# Patient Record
Sex: MALE | NOT HISPANIC OR LATINO | URBAN - METROPOLITAN AREA
[De-identification: names, ages, dates, MRNs, and addresses within clinical notes are randomized per-mention and may not be internally consistent; named-entity substitution may affect disease eponyms.]

---

## 2024-09-21 ENCOUNTER — OFFICE VISIT (OUTPATIENT)
Age: 69
End: 2024-09-21
Payer: MEDICARE

## 2024-09-21 VITALS
DIASTOLIC BLOOD PRESSURE: 72 MMHG | HEIGHT: 68 IN | TEMPERATURE: 97.9 F | BODY MASS INDEX: 27.22 KG/M2 | HEART RATE: 64 BPM | WEIGHT: 179.6 LBS | RESPIRATION RATE: 18 BRPM | OXYGEN SATURATION: 99 % | SYSTOLIC BLOOD PRESSURE: 121 MMHG

## 2024-09-21 DIAGNOSIS — H00.015 HORDEOLUM EXTERNUM OF LEFT LOWER EYELID: Primary | ICD-10-CM

## 2024-09-21 PROBLEM — I10 HYPERTENSION: Status: ACTIVE | Noted: 2024-09-21

## 2024-09-21 PROBLEM — G47.33 OBSTRUCTIVE SLEEP APNEA SYNDROME: Status: ACTIVE | Noted: 2023-01-31

## 2024-09-21 PROBLEM — I48.91 ATRIAL FIBRILLATION (HCC): Status: ACTIVE | Noted: 2021-09-16

## 2024-09-21 PROBLEM — G70.00 MYASTHENIA GRAVIS (HCC): Status: ACTIVE | Noted: 2021-09-16

## 2024-09-21 PROBLEM — G47.31 CENTRAL SLEEP APNEA SYNDROME: Status: ACTIVE | Noted: 2023-01-31

## 2024-09-21 PROBLEM — M25.561 PAIN IN RIGHT KNEE: Status: ACTIVE | Noted: 2023-08-09

## 2024-09-21 PROBLEM — E78.5 HYPERLIPEMIA: Status: ACTIVE | Noted: 2024-09-21

## 2024-09-21 PROBLEM — G47.30 ORGANIC SLEEP APNEA: Status: ACTIVE | Noted: 2021-09-16

## 2024-09-21 PROBLEM — M25.521 PAIN IN RIGHT ELBOW: Status: ACTIVE | Noted: 2023-08-09

## 2024-09-21 PROCEDURE — 99203 OFFICE O/P NEW LOW 30 MIN: CPT

## 2024-09-21 PROCEDURE — G0463 HOSPITAL OUTPT CLINIC VISIT: HCPCS

## 2024-09-21 PROCEDURE — 99213 OFFICE O/P EST LOW 20 MIN: CPT

## 2024-09-21 RX ORDER — METOPROLOL SUCCINATE 50 MG/1
TABLET, EXTENDED RELEASE ORAL
COMMUNITY

## 2024-09-21 RX ORDER — APIXABAN 5 MG/1
TABLET, FILM COATED ORAL
COMMUNITY

## 2024-09-21 RX ORDER — SIMVASTATIN 10 MG
TABLET ORAL
COMMUNITY

## 2024-09-21 RX ORDER — MONTELUKAST SODIUM 10 MG/1
TABLET ORAL
COMMUNITY
Start: 2024-07-09

## 2024-09-21 RX ORDER — PYRIDOSTIGMINE BROMIDE 60 MG/1
TABLET ORAL
COMMUNITY

## 2024-09-21 RX ORDER — ERYTHROMYCIN 5 MG/G
0.5 OINTMENT OPHTHALMIC
Qty: 3.5 G | Refills: 0 | Status: SHIPPED | OUTPATIENT
Start: 2024-09-21

## 2024-09-21 NOTE — PROGRESS NOTES
[facility]        NAME: Ashutosh Kendall is a 68 y.o. male  : 1955    MRN: [id number]  DATE: 2024  TIME: 10:05 AM    Assessment and Plan   Hordeolum externum of left lower eyelid [H00.015]  1. Hordeolum externum of left lower eyelid  erythromycin (ILOTYCIN) ophthalmic ointment            Patient Instructions   Use antibiotic ointment as prescribed - apply to subconjunctival sac and also the external part of the left lower eyelid.  Warm compresses may be created by soaking a cloth towel in hot water and then applied with gentle pressure onto the closed eyelid. They may be applied 4 to 5 times per day for 10 to 15 minutes and may be accompanied by gentle massage of the area       Follow up with Primary Care Provider in 3-5 days if not improving.  Proceed to Emergency Department if symptoms worsen.    If tests have been performed at [facility], our office will contact you with results if changes need to be made to the care plan discussed with you at the visit.  [facility]    Chief Complaint     Chief Complaint   Patient presents with    Eye Problem     Started Wednesday. Patient reports swelling/pain L eye. Noted small stye on L eye.          History of Present Illness       Swelling and watery discharge from the left eye starting about 3 days ago.  Reports no specific injuries.  No dizziness or lightheadedness, no cough or shortness of breath, no nausea vomiting or diarrhea.    Eye Problem   Associated symptoms include an eye discharge. Pertinent negatives include no fever, vomiting or weakness.       Review of Systems   Review of Systems   Constitutional:  Negative for chills and fever.   HENT:  Negative for ear pain and sore throat.    Eyes:  Positive for pain and discharge. Negative for visual disturbance.        Left lower eyelid swelling   Respiratory:  Negative for cough and shortness of breath.    Cardiovascular:  Negative for chest pain and  "palpitations.   Gastrointestinal:  Negative for abdominal pain and vomiting.   Genitourinary:  Negative for dysuria and hematuria.   Musculoskeletal:  Negative for arthralgias and back pain.   Skin:  Negative for color change and rash.   Neurological:  Negative for dizziness, seizures, syncope, weakness and light-headedness.   All other systems reviewed and are negative.        Current Medications       Current Outpatient Medications:     Eliquis 5 MG, , Disp: , Rfl:     erythromycin (ILOTYCIN) ophthalmic ointment, Administer 0.5 inches into the left eye every 6 (six) hours while awake For 7 days., Disp: 3.5 g, Rfl: 0    metoprolol succinate (TOPROL-XL) 50 mg 24 hr tablet, , Disp: , Rfl:     montelukast (SINGULAIR) 10 mg tablet, , Disp: , Rfl:     Multiple Vitamin (MULTIVITAMIN ADULT PO), Take by mouth, Disp: , Rfl:     pyridostigmine (MESTINON) 60 mg tablet, , Disp: , Rfl:     simvastatin (ZOCOR) 10 mg tablet, , Disp: , Rfl:     Current Allergies     Allergies as of 09/21/2024    (No Known Allergies)            The following portions of the patient's history were reviewed and updated as appropriate: allergies, current medications, past family history, past medical history, past social history, past surgical history and problem list.     History reviewed. No pertinent past medical history.    History reviewed. No pertinent surgical history.    History reviewed. No pertinent family history.      Medications have been verified.        Objective   /72   Pulse 64   Temp 97.9 °F (36.6 °C) (Tympanic)   Resp 18   Ht 5' 8\" (1.727 m)   Wt 81.5 kg (179 lb 9.6 oz)   SpO2 99%   BMI 27.31 kg/m²   No LMP for male patient.       Physical Exam     Physical Exam  Vitals and nursing note reviewed.   Constitutional:       Appearance: Normal appearance.   HENT:      Head: Normocephalic and atraumatic.   Eyes:      General:         Left eye: Discharge and hordeolum present.    Pulmonary:      Effort: Pulmonary effort is " normal.   Skin:     General: Skin is warm and dry.      Capillary Refill: Capillary refill takes less than 2 seconds.   Neurological:      General: No focal deficit present.      Mental Status: He is alert and oriented to person, place, and time. Mental status is at baseline.      Sensory: No sensory deficit.      Motor: No weakness.   Psychiatric:         Mood and Affect: Mood normal.         Behavior: Behavior normal.         Thought Content: Thought content normal.

## 2024-09-21 NOTE — PATIENT INSTRUCTIONS
Use antibiotic ointment as prescribed - apply to subconjunctival sac and also the external part of the left lower eyelid.  Warm compresses may be created by soaking a cloth towel in hot water and then applied with gentle pressure onto the closed eyelid. They may be applied 4 to 5 times per day for 10 to 15 minutes and may be accompanied by gentle massage of the area       Follow up with Primary Care Provider in 3-5 days if not improving.  Proceed to Emergency Department if symptoms worsen.    If tests have been performed at [facility], our office will contact you with results if changes need to be made to the care plan discussed with you at the visit.  You can review your full results on [facility] MyChart.